# Patient Record
Sex: FEMALE | Race: OTHER | HISPANIC OR LATINO | Employment: FULL TIME | URBAN - METROPOLITAN AREA
[De-identification: names, ages, dates, MRNs, and addresses within clinical notes are randomized per-mention and may not be internally consistent; named-entity substitution may affect disease eponyms.]

---

## 2021-03-16 ENCOUNTER — HOSPITAL ENCOUNTER (EMERGENCY)
Facility: HOSPITAL | Age: 26
Discharge: HOME | End: 2021-03-16
Attending: EMERGENCY MEDICINE
Payer: COMMERCIAL

## 2021-03-16 VITALS
WEIGHT: 120 LBS | SYSTOLIC BLOOD PRESSURE: 118 MMHG | HEART RATE: 105 BPM | RESPIRATION RATE: 18 BRPM | TEMPERATURE: 98.5 F | OXYGEN SATURATION: 99 % | DIASTOLIC BLOOD PRESSURE: 55 MMHG

## 2021-03-16 DIAGNOSIS — R51.9 HEADACHE: ICD-10-CM

## 2021-03-16 DIAGNOSIS — B34.9 ACUTE VIRAL SYNDROME: Primary | ICD-10-CM

## 2021-03-16 DIAGNOSIS — R11.0 NAUSEA: ICD-10-CM

## 2021-03-16 PROCEDURE — 99284 EMERGENCY DEPT VISIT MOD MDM: CPT | Performed by: EMERGENCY MEDICINE

## 2021-03-16 PROCEDURE — 99283 EMERGENCY DEPT VISIT LOW MDM: CPT

## 2021-03-16 PROCEDURE — U0003 INFECTIOUS AGENT DETECTION BY NUCLEIC ACID (DNA OR RNA); SEVERE ACUTE RESPIRATORY SYNDROME CORONAVIRUS 2 (SARS-COV-2) (CORONAVIRUS DISEASE [COVID-19]), AMPLIFIED PROBE TECHNIQUE, MAKING USE OF HIGH THROUGHPUT TECHNOLOGIES AS DESCRIBED BY CMS-2020-01-R: HCPCS | Performed by: EMERGENCY MEDICINE

## 2021-03-16 PROCEDURE — U0005 INFEC AGEN DETEC AMPLI PROBE: HCPCS | Performed by: EMERGENCY MEDICINE

## 2021-03-16 RX ORDER — ACETAMINOPHEN 325 MG/1
975 TABLET ORAL ONCE
Status: COMPLETED | OUTPATIENT
Start: 2021-03-16 | End: 2021-03-16

## 2021-03-16 RX ORDER — IBUPROFEN 600 MG/1
600 TABLET ORAL ONCE
Status: COMPLETED | OUTPATIENT
Start: 2021-03-16 | End: 2021-03-16

## 2021-03-16 RX ADMIN — ACETAMINOPHEN 975 MG: 325 TABLET ORAL at 17:11

## 2021-03-16 RX ADMIN — IBUPROFEN 600 MG: 600 TABLET, FILM COATED ORAL at 17:11

## 2021-03-16 NOTE — ED PROVIDER NOTES
History  Chief Complaint   Patient presents with    Medical Problem     Here for covid swab      The patient is a 24yo female with no medical history who presents to the ED for evaluation of headache, myalgias, and nausea  Her symptoms started yesterday morning, headache started gradually then, followed by generalized muscle aches  Her headache feels similar to prior headaches  Does not take any medications for her symptoms  No loss of taste or smell  No vomiting, no abdominal pain, no diarrhea, no fever, no chills  She does complain of intermittent nausea, but has been tolerating p o  Intake  LMP currently on menstrual period  Recent sick contacts include her 4 children, her mother with similar symptoms  The family's apartment building is heated with electric heat, they have not been utilizing any other sources of heat  No neck stiffness, no weakness  Patient is requesting a work note  History provided by:  Patient   used: Yes ([id number])    Medical Problem  Onset quality:  Gradual  Timing:  Constant  Progression:  Unchanged  Chronicity:  New  Associated symptoms: cough, headaches, myalgias and nausea    Associated symptoms: no abdominal pain, no diarrhea, no fever, no shortness of breath and no vomiting    Cough:     Cough characteristics:  Non-productive    Onset quality:  Gradual    Timing:  Constant    Progression:  Unchanged    Chronicity:  New  Myalgias:     Location:  Neck    Onset quality:  Gradual    Timing:  Constant    Progression:  Unchanged      None       History reviewed  No pertinent past medical history  History reviewed  No pertinent surgical history  History reviewed  No pertinent family history  I have reviewed and agree with the history as documented      E-Cigarette/Vaping     E-Cigarette/Vaping Substances     Social History     Tobacco Use    Smoking status: Never Smoker    Smokeless tobacco: Never Used   Substance Use Topics    Alcohol use: Not Currently    Drug use: Not Currently        Review of Systems   Constitutional: Negative  Negative for chills and fever  HENT: Negative  Eyes: Negative  Respiratory: Positive for cough  Negative for shortness of breath  Cardiovascular: Negative  Gastrointestinal: Positive for nausea  Negative for abdominal pain, diarrhea and vomiting  Endocrine: Negative  Genitourinary: Negative  Negative for dysuria, frequency and vaginal discharge  Musculoskeletal: Positive for myalgias  Skin: Negative  Allergic/Immunologic: Negative  Neurological: Positive for headaches  Hematological: Negative  Psychiatric/Behavioral: Negative  Physical Exam  ED Triage Vitals [03/16/21 1639]   Temperature Pulse Respirations Blood Pressure SpO2   98 5 °F (36 9 °C) 105 18 118/55 99 %      Temp Source Heart Rate Source Patient Position - Orthostatic VS BP Location FiO2 (%)   Oral -- -- -- --      Pain Score       4             Orthostatic Vital Signs  Vitals:    03/16/21 1639   BP: 118/55   Pulse: 105       Physical Exam  Vitals signs reviewed  Constitutional:       Appearance: She is well-developed  She is not diaphoretic  HENT:      Head: Normocephalic and atraumatic  Right Ear: External ear normal       Left Ear: External ear normal       Nose: Nose normal    Eyes:      General: No scleral icterus  Extraocular Movements: Extraocular movements intact  Conjunctiva/sclera: Conjunctivae normal    Neck:      Musculoskeletal: Normal range of motion and neck supple  Vascular: No JVD  Trachea: No tracheal deviation  Comments: No meningismus  Cardiovascular:      Rate and Rhythm: Normal rate and regular rhythm  Heart sounds: Normal heart sounds  No murmur  Pulmonary:      Effort: Pulmonary effort is normal  No respiratory distress  Breath sounds: Normal breath sounds  Abdominal:      General: Bowel sounds are normal  There is no distension        Palpations: Abdomen is soft  Tenderness: There is no abdominal tenderness  There is no guarding  Musculoskeletal: Normal range of motion  Skin:     General: Skin is warm and dry  Capillary Refill: Capillary refill takes less than 2 seconds  Neurological:      General: No focal deficit present  Mental Status: She is alert and oriented to person, place, and time  Motor: No abnormal muscle tone  Psychiatric:         Mood and Affect: Mood normal          Behavior: Behavior normal          Thought Content: Thought content normal          Judgment: Judgment normal          ED Medications  Medications   ibuprofen (MOTRIN) tablet 600 mg (600 mg Oral Given 3/16/21 1711)   acetaminophen (TYLENOL) tablet 975 mg (975 mg Oral Given 3/16/21 1711)       Diagnostic Studies  Results Reviewed     Procedure Component Value Units Date/Time    Novel Coronavirus Marthana Colton Nickerson [[id number]] Collected: 03/16/21 1649    Lab Status: No result Specimen: Nares from Nose                  No orders to display         Procedures  Procedures      ED Course  ED Course as of Mar 16 1735   Tue Mar 16, 2021   1633 Work note                                SBIRT 22yo+      Most Recent Value   SBIRT (23 yo +)   In order to provide better care to our patients, we are screening all of our patients for alcohol and drug use  Would it be okay to ask you these screening questions? No Filed at: 03/16/2021 1634                MDM  Number of Diagnoses or Management Options  Acute viral syndrome: new and requires workup  Headache: new and does not require workup  Nausea: new and does not require workup  Diagnosis management comments: Well-appearing 30-year-old female who presents with constellation of symptoms including headache, nausea, likely viral syndrome  She does not have anything to suggest carbon monoxide, given their heat source  She has no red flag symptoms neck is supple with no meningismus, she is alert and oriented    Will treat symptomatically with Tylenol and ibuprofen, COVID-19 testing requested and a work note was given to the patient with instructions regarding return of work once COVID-19 results  All questions were answered prior to discharge  Amount and/or Complexity of Data Reviewed  Clinical lab tests: ordered  Review and summarize past medical records: yes        Disposition  Final diagnoses:   Acute viral syndrome   Headache   Nausea     Time reflects when diagnosis was documented in both MDM as applicable and the Disposition within this note     Time User Action Codes Description Comment    3/16/2021  4:57 PM Limmie Bigger [B34 9] Acute viral syndrome     3/16/2021  4:57 PM Limmie Bigger [R51 9] Headache     3/16/2021  4:57 PM Maurizio Burrows Add [R11 0] Nausea       ED Disposition     ED Disposition Condition Date/Time Comment    Discharge Stable Tue Mar 16, 2021  4:57 PM [facility] discharge to home/self care  Follow-up Information     Follow up With Specialties Details Why Contact Info Additional Information    Carlos Mejia MD Family Medicine Call  As needed [address] Emergency Department Emergency Medicine Go to  As needed, If symptoms worsen [address]  [address] [place] [address] 64 [facility] Emergency Department, [address], [facility], [address] [facility], [address]   [phone]          There are no discharge medications for this patient  No discharge procedures on file  PDMP Review     None           ED Provider  Attending physically available and evaluated [facility]  I managed the patient along with the ED Attending      Electronically Signed by         Samir Mason DO  03/16/21 7799

## 2021-03-16 NOTE — Clinical Note
Yelitza Wan was seen and treated in our emergency department on 3/16/2021  Diagnosis:     Radwalys    She may return on this date: Your COVID results were negative  Continue to quarantine until you have 3 days without symptoms     If you have any questions or concerns, please don't hesitate to call        Josh Morales PA-C    ______________________________           _______________          _______________  Hospital Representative                              Date                                Time

## 2021-03-16 NOTE — Clinical Note
Jessica Greene was seen and treated in our emergency department on 3/16/2021  Diagnosis:     Joaquin  may return to work on return date  She may return on this date:     If COVID test is negative may return after fever free for 24 hours and improving symptoms  If COVID test is positive may return after 14 days from start of symptoms if fever has resolved and symptoms are improving  Si la prueba covid es negativa puede volver después de la fiebre cherie alessandro 24 horas y [employer]  Si la prueba covid es positiva puede volver después de 14 días desde el inicio de los síntomas si la fiebre se ha resuelto y los síntomas están mejorando  If you have any questions or concerns, please don't hesitate to call        Ivan Allen MD    ______________________________           _______________          _______________  Hospital Representative                              Date                                Time

## 2021-03-16 NOTE — Clinical Note
Jennifer Salinas was seen and treated in our emergency department on 3/16/2021  Diagnosis:     Radwalys    She may return on this date: Your COVID results were negative  If you have any questions or concerns, please don't hesitate to call        Zak Fowler PA-C    ______________________________           _______________          _______________  Hospital Representative                              Date                                Time

## 2021-03-16 NOTE — DISCHARGE INSTRUCTIONS
constantino tylenol y ibuprofeno para el dolor / fiebre  puede constantino descongestionantes krystal Mucinex (disponible en el mostrador), para moco y tos  se le notificará de los Jerome de navarro prueba covid-19, en 1-2 días  por ahora, cuarentena en casa, lavarse las [place], usar navarro máscara si tiene que salir de navarro casa  si tienes problemas para respirar, regresa al servicio de [facility]

## 2021-03-17 LAB — SARS-COV-2 RNA RESP QL NAA+PROBE: NEGATIVE

## 2021-03-17 NOTE — ED ATTENDING ATTESTATION
3/16/2021  IJoan MD, saw and evaluated the patient  I have discussed the patient with the resident/non-physician practitioner and agree with the resident's/non-physician practitioner's findings, Plan of Care, and MDM as documented in the resident's/non-physician practitioner's note, except where noted  All available labs and Radiology studies were reviewed  I was present for key portions of any procedure(s) performed by the resident/non-physician practitioner and I was immediately available to provide assistance  At this point I agree with the current assessment done in the Emergency Department  I have conducted an independent evaluation of this patient a history and physical is as follows: This is a 31 y o  old female who presents to the ED for evaluation of headache myalgias, nausea  Since yesterday morning gradual onset headache and myalgias  Multiple sick contacts in the home  No loss of taste or smell, no nausea vomiting diarrhea abdominal pain  No cough  No known COVID exposures  No risk factors for carbon monoxide are identified  VS and nursing notes reviewed    General: Appears in NAD, awake, alert, speaking normally in full sentences  Well-nourished, well-developed  Appears stated age  Head: Normocephalic, atraumatic  Eyes: EOMI  Vision grossly normal  No subconjunctival hemorrhages or occular discharge noted  Symmetrical lids  ENT: Atraumatic external nose and ears  No stridor  Normal phonation  No drooling  Normal swallowing  Neck: No JVD  FROM  No goiter  CV: No pallor  Normal rate  Lungs: No tachypnea  No respiratory distress  MSK: Moving all extremities equally, no peripheral edema  Skin: Dry, intact  No cyanosis  Neuro: Awake, alert, GCS15  CN II-XII grossly intact  Grossly normal gait  Psychiatric/Behavioral: Appropriate mood and affect  A/P: This is a 31 y o  female who presents to the ED for evaluation of she viral symptoms    COVID testing symptomatic management, work note as requested    ED Course         Critical Care Time  Procedures

## 2021-03-25 ENCOUNTER — HOSPITAL ENCOUNTER (EMERGENCY)
Facility: HOSPITAL | Age: 26
Discharge: HOME | End: 2021-03-25
Attending: EMERGENCY MEDICINE
Payer: COMMERCIAL

## 2021-03-25 VITALS
RESPIRATION RATE: 18 BRPM | OXYGEN SATURATION: 100 % | TEMPERATURE: 98.5 F | DIASTOLIC BLOOD PRESSURE: 60 MMHG | SYSTOLIC BLOOD PRESSURE: 112 MMHG | HEART RATE: 89 BPM

## 2021-03-25 DIAGNOSIS — R10.9 ABDOMINAL PAIN: Primary | ICD-10-CM

## 2021-03-25 LAB
ALBUMIN SERPL BCP-MCNC: 3.6 G/DL (ref 3.5–5)
ALP SERPL-CCNC: 72 U/L (ref 46–116)
ALT SERPL W P-5'-P-CCNC: 23 U/L (ref 12–78)
ANION GAP SERPL CALCULATED.3IONS-SCNC: 6 MMOL/L (ref 4–13)
AST SERPL W P-5'-P-CCNC: 14 U/L (ref 5–45)
BASOPHILS # BLD AUTO: 0.03 THOUSANDS/ΜL (ref 0–0.1)
BASOPHILS NFR BLD AUTO: 1 % (ref 0–1)
BILIRUB SERPL-MCNC: 0.19 MG/DL (ref 0.2–1)
BILIRUB UR QL STRIP: NEGATIVE
BUN SERPL-MCNC: 8 MG/DL (ref 5–25)
CALCIUM SERPL-MCNC: 9.1 MG/DL (ref 8.3–10.1)
CHLORIDE SERPL-SCNC: 106 MMOL/L (ref 100–108)
CLARITY UR: CLEAR
CO2 SERPL-SCNC: 30 MMOL/L (ref 21–32)
COLOR UR: YELLOW
CREAT SERPL-MCNC: 0.82 MG/DL (ref 0.6–1.3)
EOSINOPHIL # BLD AUTO: 0.04 THOUSAND/ΜL (ref 0–0.61)
EOSINOPHIL NFR BLD AUTO: 1 % (ref 0–6)
ERYTHROCYTE [DISTWIDTH] IN BLOOD BY AUTOMATED COUNT: 13.9 % (ref 11.6–15.1)
EXT PREG TEST URINE: NEGATIVE
EXT. CONTROL ED NAV: NORMAL
GFR SERPL CREATININE-BSD FRML MDRD: 98 ML/MIN/1.73SQ M
GLUCOSE SERPL-MCNC: 96 MG/DL (ref 65–140)
GLUCOSE UR STRIP-MCNC: NEGATIVE MG/DL
HCT VFR BLD AUTO: 35.2 % (ref 34.8–46.1)
HGB BLD-MCNC: 10.9 G/DL (ref 11.5–15.4)
HGB UR QL STRIP.AUTO: NEGATIVE
IMM GRANULOCYTES # BLD AUTO: 0.02 THOUSAND/UL (ref 0–0.2)
IMM GRANULOCYTES NFR BLD AUTO: 0 % (ref 0–2)
KETONES UR STRIP-MCNC: NEGATIVE MG/DL
LEUKOCYTE ESTERASE UR QL STRIP: NEGATIVE
LIPASE SERPL-CCNC: 209 U/L (ref 73–393)
LYMPHOCYTES # BLD AUTO: 1.01 THOUSANDS/ΜL (ref 0.6–4.47)
LYMPHOCYTES NFR BLD AUTO: 18 % (ref 14–44)
MCH RBC QN AUTO: 24.2 PG (ref 26.8–34.3)
MCHC RBC AUTO-ENTMCNC: 31 G/DL (ref 31.4–37.4)
MCV RBC AUTO: 78 FL (ref 82–98)
MONOCYTES # BLD AUTO: 0.57 THOUSAND/ΜL (ref 0.17–1.22)
MONOCYTES NFR BLD AUTO: 10 % (ref 4–12)
NEUTROPHILS # BLD AUTO: 4.02 THOUSANDS/ΜL (ref 1.85–7.62)
NEUTS SEG NFR BLD AUTO: 70 % (ref 43–75)
NITRITE UR QL STRIP: NEGATIVE
NRBC BLD AUTO-RTO: 0 /100 WBCS
PH UR STRIP.AUTO: 7.5 [PH]
PLATELET # BLD AUTO: 254 THOUSANDS/UL (ref 149–390)
PMV BLD AUTO: 10.3 FL (ref 8.9–12.7)
POTASSIUM SERPL-SCNC: 3.8 MMOL/L (ref 3.5–5.3)
PROT SERPL-MCNC: 6.8 G/DL (ref 6.4–8.2)
PROT UR STRIP-MCNC: NEGATIVE MG/DL
RBC # BLD AUTO: 4.5 MILLION/UL (ref 3.81–5.12)
SODIUM SERPL-SCNC: 142 MMOL/L (ref 136–145)
SP GR UR STRIP.AUTO: 1.01 (ref 1–1.03)
UROBILINOGEN UR QL STRIP.AUTO: 0.2 E.U./DL
WBC # BLD AUTO: 5.69 THOUSAND/UL (ref 4.31–10.16)

## 2021-03-25 PROCEDURE — 99284 EMERGENCY DEPT VISIT MOD MDM: CPT | Performed by: EMERGENCY MEDICINE

## 2021-03-25 PROCEDURE — 96360 HYDRATION IV INFUSION INIT: CPT

## 2021-03-25 PROCEDURE — 99284 EMERGENCY DEPT VISIT MOD MDM: CPT

## 2021-03-25 PROCEDURE — 80053 COMPREHEN METABOLIC PANEL: CPT | Performed by: EMERGENCY MEDICINE

## 2021-03-25 PROCEDURE — 81025 URINE PREGNANCY TEST: CPT | Performed by: EMERGENCY MEDICINE

## 2021-03-25 PROCEDURE — 83690 ASSAY OF LIPASE: CPT | Performed by: EMERGENCY MEDICINE

## 2021-03-25 PROCEDURE — 85025 COMPLETE CBC W/AUTO DIFF WBC: CPT | Performed by: EMERGENCY MEDICINE

## 2021-03-25 PROCEDURE — 36415 COLL VENOUS BLD VENIPUNCTURE: CPT | Performed by: EMERGENCY MEDICINE

## 2021-03-25 PROCEDURE — 96361 HYDRATE IV INFUSION ADD-ON: CPT

## 2021-03-25 PROCEDURE — 81003 URINALYSIS AUTO W/O SCOPE: CPT | Performed by: EMERGENCY MEDICINE

## 2021-03-25 RX ADMIN — SODIUM CHLORIDE 1000 ML: 0.9 INJECTION, SOLUTION INTRAVENOUS at 16:25

## 2021-03-25 NOTE — Clinical Note
Davina Syed was seen and treated in our emergency department on 3/25/2021  Diagnosis:      Joaquin  may return to work on return date  She may return on this date: 03/26/2021          If you have any questions or concerns, please don't hesitate to call        Niurka Solis RN    ______________________________           _______________          _______________  Hospital Representative                              Date                                Time

## 2021-03-25 NOTE — ED PROVIDER NOTES
History  Chief Complaint   Patient presents with    Abdominal Pain     pt c/o LLQ pain for a "little while now," with nausea  denies vomiting or diarrhea  states pain is worse after eating       History provided by:  Patient   used: Yes    Abdominal Pain  Pain location:  LLQ  Pain quality: aching    Pain radiates to:  Does not radiate  Pain severity:  Moderate  Onset quality:  Gradual  Duration: Six month  Timing:  Intermittent  Progression:  Waxing and waning  Chronicity:  Recurrent  Context: not previous surgeries    Relieved by:  None tried  Worsened by:  Nothing  Ineffective treatments:  None tried  Associated symptoms: nausea    Associated symptoms: no constipation and no vomiting        None       History reviewed  No pertinent past medical history  History reviewed  No pertinent surgical history  History reviewed  No pertinent family history  I have reviewed and agree with the history as documented  E-Cigarette/Vaping     E-Cigarette/Vaping Substances     Social History     Tobacco Use    Smoking status: Never Smoker    Smokeless tobacco: Never Used   Substance Use Topics    Alcohol use: Not Currently    Drug use: Not Currently       Review of Systems   Gastrointestinal: Positive for abdominal pain and nausea  Negative for abdominal distention, constipation and vomiting  All other systems reviewed and are negative  Physical Exam  Physical Exam  Vitals signs and nursing note reviewed  Constitutional:       General: She is not in acute distress  Appearance: She is well-developed  HENT:      Head: Normocephalic and atraumatic  Eyes:      Pupils: Pupils are equal, round, and reactive to light  Neck:      Musculoskeletal: Normal range of motion and neck supple  Cardiovascular:      Rate and Rhythm: Normal rate and regular rhythm  Heart sounds: Normal heart sounds  No murmur  Pulmonary:      Effort: Pulmonary effort is normal  No respiratory distress  Breath sounds: Normal breath sounds  No wheezing or rales  Abdominal:      General: Bowel sounds are normal  There is no distension  Palpations: Abdomen is soft  Tenderness: There is no abdominal tenderness  There is no guarding or rebound  Comments: Soft, nontender, nondistended  Musculoskeletal: Normal range of motion  General: No deformity  Lymphadenopathy:      Cervical: No cervical adenopathy  Skin:     Capillary Refill: Capillary refill takes less than 2 seconds  Findings: No erythema or rash  Neurological:      Mental Status: She is alert and oriented to person, place, and time  Cranial Nerves: No cranial nerve deficit  Motor: No abnormal muscle tone        Coordination: Coordination normal    Psychiatric:         Behavior: Behavior normal          Vital Signs  ED Triage Vitals   Temperature Pulse Respirations Blood Pressure SpO2   03/25/21 1522 03/25/21 1524 03/25/21 1524 03/25/21 1524 03/25/21 1524   98 5 °F (36 9 °C) 89 18 112/60 100 %      Temp Source Heart Rate Source Patient Position - Orthostatic VS BP Location FiO2 (%)   03/25/21 1522 03/25/21 1524 03/25/21 1524 03/25/21 1524 --   Oral Monitor Sitting Left arm       Pain Score       --                  Vitals:    03/25/21 1524   BP: 112/60   Pulse: 89   Patient Position - Orthostatic VS: Sitting         Visual Acuity      ED Medications  Medications   sodium chloride 0 9 % bolus 1,000 mL (0 mL Intravenous Stopped 3/25/21 1815)       Diagnostic Studies  Results Reviewed     Procedure Component Value Units Date/Time    UA w Reflex to Microscopic w Reflex to Culture [[id number]] Collected: 03/25/21 1647    Lab Status: Final result Specimen: Urine, Other Updated: 03/25/21 1752     Color, UA Yellow     Clarity, UA Clear     Specific Gravity, UA 1 015     pH, UA 7 5     Leukocytes, UA Negative     Nitrite, UA Negative     Protein, UA Negative mg/dl      Glucose, UA Negative mg/dl      Ketones, UA Negative mg/dl      Urobilinogen, UA 0 2 E U /dl      Bilirubin, UA Negative     Blood, UA Negative    POCT pregnancy, urine [[id number]]  (Normal) Resulted: 03/25/21 1648    Lab Status: Final result Updated: 03/25/21 1650     EXT PREG TEST UR (Ref: Negative) negative     Control valid    Comprehensive metabolic panel [[id number]]  (Abnormal) Collected: 03/25/21 1624    Lab Status: Final result Specimen: Blood from Arm, Right Updated: 03/25/21 1646     Sodium 142 mmol/L      Potassium 3 8 mmol/L      Chloride 106 mmol/L      CO2 30 mmol/L      ANION GAP 6 mmol/L      BUN 8 mg/dL      Creatinine 0 82 mg/dL      Glucose 96 mg/dL      Calcium 9 1 mg/dL      AST 14 U/L      ALT 23 U/L      Alkaline Phosphatase 72 U/L      Total Protein 6 8 g/dL      Albumin 3 6 g/dL      Total Bilirubin 0 19 mg/dL      eGFR 98 ml/min/1 73sq m     Narrative:      [place] guidelines for Chronic Kidney Disease (CKD):     Stage 1 with normal or high GFR (GFR > 90 mL/min/1 73 square meters)    Stage 2 Mild CKD (GFR = 60-89 mL/min/1 73 square meters)    Stage 3A Moderate CKD (GFR = 45-59 mL/min/1 73 square meters)    Stage 3B Moderate CKD (GFR = 30-44 mL/min/1 73 square meters)    Stage 4 Severe CKD (GFR = 15-29 mL/min/1 73 square meters)    Stage 5 End Stage CKD (GFR <15 mL/min/1 73 square meters)  Note: GFR calculation is accurate only with a steady state creatinine    Lipase [[id number]]  (Normal) Collected: 03/25/21 1624    Lab Status: Final result Specimen: Blood from Arm, Right Updated: 03/25/21 1646     Lipase 209 u/L     CBC and differential [[id number]]  (Abnormal) Collected: 03/25/21 1624    Lab Status: Final result Specimen: Blood from Arm, Right Updated: 03/25/21 1632     WBC 5 69 Thousand/uL      RBC 4 50 Million/uL      Hemoglobin 10 9 g/dL      Hematocrit 35 2 %      MCV 78 fL      MCH 24 2 pg      MCHC 31 0 g/dL      RDW 13 9 %      MPV 10 3 fL      Platelets 986 Thousands/uL      nRBC 0 /100 WBCs Neutrophils Relative 70 %      Immat GRANS % 0 %      Lymphocytes Relative 18 %      Monocytes Relative 10 %      Eosinophils Relative 1 %      Basophils Relative 1 %      Neutrophils Absolute 4 02 Thousands/µL      Immature Grans Absolute 0 02 Thousand/uL      Lymphocytes Absolute 1 01 Thousands/µL      Monocytes Absolute 0 57 Thousand/µL      Eosinophils Absolute 0 04 Thousand/µL      Basophils Absolute 0 03 Thousands/µL                  No orders to display              Procedures  Procedures         ED Course                                           MDM  Number of Diagnoses or Management Options  Abdominal pain: new and requires workup  Diagnosis management comments: Left lower quadrant abdominal pain, present after eating for the past 6 months  Patient is [facility]   She states she had workup done including blood work and ultrasound but nothing was found  She has no current pain at this time  She states pain routinely gets worse with eating  Denies any vaginal bleeding or discharge  Denies any previous surgical history  Vital signs unremarkable  Abdomen soft and nontender  Will check basic laboratory studies  Basic labs unremarkable  Abdomen soft and nontender, no emergent imaging indicated  Patient will likely benefit outpatient GI evaluation given duration of symptoms  Given information GI clinic         Amount and/or Complexity of Data Reviewed  Clinical lab tests: ordered and reviewed  Tests in the medicine section of CPT®: ordered and reviewed    Risk of Complications, Morbidity, and/or Mortality  Presenting problems: high  Diagnostic procedures: moderate  Management options: high    Patient Progress  Patient progress: stable      Disposition  Final diagnoses:   Abdominal pain     Time reflects when diagnosis was documented in both MDM as applicable and the Disposition within this note     Time User Action Codes Description Comment    3/25/2021  5:54 PM Jessica Liu Add [R10 9] Abdominal pain       ED Disposition     ED Disposition Condition Date/Time Comment    Discharge Stable Thu Mar 25, 2021  5:53 PM Joaquin Olmedo discharge to home/self care  Follow-up Information     Follow up With Specialties Details Why Contact Info Additional [facility] Schedule an appointment as soon as possible for a visit in 1 week Follow-up recurrent abdominal pain with eating [address] [facility] [address] [facility] [address] [phone] Neva Nolasco [facility], [address] [place] [address] [address], [place], [address] [facility], [address]    Felicitas Oakes MD Family Medicine Schedule an appointment as soon as possible for a visit in 2 days As needed [address] [facility],[address] [place] [address] Emergency Department Emergency Medicine Go to  If symptoms worsen [address] [facility] [address] [facility] Emergency Department, [address], [place], [address] [facility], [address]          There are no discharge medications for this patient  No discharge procedures on file      PDMP Review     None          ED Provider  Electronically Signed by           Belle Busch MD  03/25/21 2012

## 2021-03-30 ENCOUNTER — OFFICE VISIT (OUTPATIENT)
Dept: GASTROENTEROLOGY | Facility: CLINIC | Age: 26
End: 2021-03-30
Payer: COMMERCIAL

## 2021-03-30 VITALS
TEMPERATURE: 97.3 F | HEIGHT: 64 IN | DIASTOLIC BLOOD PRESSURE: 70 MMHG | HEART RATE: 88 BPM | WEIGHT: 130 LBS | BODY MASS INDEX: 22.2 KG/M2 | SYSTOLIC BLOOD PRESSURE: 104 MMHG

## 2021-03-30 DIAGNOSIS — D50.8 OTHER IRON DEFICIENCY ANEMIA: ICD-10-CM

## 2021-03-30 DIAGNOSIS — R10.32 LEFT LOWER QUADRANT ABDOMINAL PAIN: Primary | ICD-10-CM

## 2021-03-30 DIAGNOSIS — R10.13 DYSPEPSIA: ICD-10-CM

## 2021-03-30 PROBLEM — D50.9 IRON DEFICIENCY ANEMIA: Status: ACTIVE | Noted: 2021-03-30

## 2021-03-30 PROCEDURE — 99204 OFFICE O/P NEW MOD 45 MIN: CPT | Performed by: INTERNAL MEDICINE

## 2021-03-30 RX ORDER — PANTOPRAZOLE SODIUM 40 MG/1
40 TABLET, DELAYED RELEASE ORAL DAILY
Qty: 30 TABLET | Refills: 3 | Status: SHIPPED | OUTPATIENT
Start: 2021-03-30

## 2021-03-30 NOTE — PROGRESS NOTES
[facility] - Outpatient Consultation  Arian Castillo 31 y o  female MRN: [id number]  Encounter: [id number]          ASSESSMENT AND PLAN:      31year old female here for evaluation of anemia, chronic abdominal pain and nausea  She has been experiencing pain in lower abdomen and epigastric area  Recently she noted significant worsening of her symptoms  Her pain is worse after eating  Differential diagnosis include peptic ulcer disease, dyspepsia, irritable bowel syndrome rule out inflammatory bowel disease  Because of significant tenderness on left lower quadrant I will schedule her for CT scan to rule out diverticulitis and inflammatory bowel disease  EGD and colonoscopy to assess for peptic ulcer disease and inflammatory bowel  I will start her on pantoprazole 40 mg daily   Avoid NSAID  Check celiac panel  Follow-up of the workup is completed  ______________________________________________________________________    HPI:  11-year-old female who recently moved from [facility] here for evaluation chronic abdominal pain and nausea  She has been experiencing lower abdominal pain predominantly on left lower quadrant and epigastric region  Her symptoms started several years ago but she noted recent worsening of her symptoms  She had multiple ER visits in [facility] and here recently  She recalls having ultrasound in [facility] and she was told that she had small ovarian cyst   She denies diarrhea or constipation  No melena or hematochezia  She takes Advil 1 to 2 times a month for migraine headache and menstrual cramps  She had 2  section and 1 spontaneous vaginal delivery  She denies family history of inflammatory bowel disease  She denies weight loss  Hb 10 9    REVIEW OF SYSTEMS:    CONSTITUTIONAL: Denies any fever, chills, rigors, and weight loss  HEENT: No earache or tinnitus  Denies hearing loss or visual disturbances    CARDIOVASCULAR: No chest pain or palpitations  RESPIRATORY: Denies any cough, hemoptysis, shortness of breath or dyspnea on exertion  GASTROINTESTINAL: As noted in the History of Present Illness  GENITOURINARY: No problems with urination  Denies any hematuria or dysuria  NEUROLOGIC: No dizziness or vertigo, denies headaches  MUSCULOSKELETAL: Denies any muscle or joint pain  SKIN: Denies skin rashes or itching  ENDOCRINE: Denies excessive thirst  Denies intolerance to heat or cold  PSYCHOSOCIAL: Denies depression or anxiety  Denies any recent memory loss  Historical Information   History reviewed  No pertinent past medical history  History reviewed  No pertinent surgical history  Social History   Social History     Substance and Sexual Activity   Alcohol Use Not Currently     Social History     Substance and Sexual Activity   Drug Use Not Currently     Social History     Tobacco Use   Smoking Status Never Smoker   Smokeless Tobacco Never Used     History reviewed  No pertinent family history  Meds/Allergies     No current outpatient medications on file  No Known Allergies        Objective     Blood pressure 104/70, pulse 88, temperature (!) 97 3 °F (36 3 °C), temperature source Tympanic, height 5' 4" (1 626 m), weight 59 kg (130 lb)  Body mass index is 22 31 kg/m²  PHYSICAL EXAM:      General Appearance:   Alert, cooperative, no distress   HEENT:   Normocephalic, atraumatic, anicteric      Neck:  Supple, symmetrical, trachea midline   Lungs:   Clear to auscultation bilaterally; no rales, rhonchi or wheezing; respirations unlabored    Heart[de-identified]   Regular rate and rhythm; no murmur, rub, or gallop     Abdomen:   Soft, LLQ tenderness, non-distended; normal bowel sounds; no masses, no organomegaly    Genitalia:   Deferred    Rectal:   Deferred    Extremities:  No cyanosis, clubbing or edema    Pulses:  2+ and symmetric    Skin:  No jaundice, rashes, or lesions    Lymph nodes:  No palpable cervical lymphadenopathy  Lab Results:   No visits with results within 1 Day(s) from this visit     Latest known visit with results is:   Admission on 03/25/2021, Discharged on 03/25/2021   Component Date Value    WBC 03/25/2021 5 69     RBC 03/25/2021 4 50     Hemoglobin 03/25/2021 10 9*    Hematocrit 03/25/2021 35 2     MCV 03/25/2021 78*    MCH 03/25/2021 24 2*    MCHC 03/25/2021 31 0*    RDW 03/25/2021 13 9     MPV 03/25/2021 10 3     Platelets 63/10/3408 254     nRBC 03/25/2021 0     Neutrophils Relative 03/25/2021 70     Immat GRANS % 03/25/2021 0     Lymphocytes Relative 03/25/2021 18     Monocytes Relative 03/25/2021 10     Eosinophils Relative 03/25/2021 1     Basophils Relative 03/25/2021 1     Neutrophils Absolute 03/25/2021 4 02     Immature Grans Absolute 03/25/2021 0 02     Lymphocytes Absolute 03/25/2021 1 01     Monocytes Absolute 03/25/2021 0 57     Eosinophils Absolute 03/25/2021 0 04     Basophils Absolute 03/25/2021 0 03     Sodium 03/25/2021 142     Potassium 03/25/2021 3 8     Chloride 03/25/2021 106     CO2 03/25/2021 30     ANION GAP 03/25/2021 6     BUN 03/25/2021 8     Creatinine 03/25/2021 0 82     Glucose 03/25/2021 96     Calcium 03/25/2021 9 1     AST 03/25/2021 14     ALT 03/25/2021 23     Alkaline Phosphatase 03/25/2021 72     Total Protein 03/25/2021 6 8     Albumin 03/25/2021 3 6     Total Bilirubin 03/25/2021 0 19*    eGFR 03/25/2021 98     Lipase 03/25/2021 209     EXT PREG TEST UR (Ref: N* 03/25/2021 negative     Control 03/25/2021 valid     Color, UA 03/25/2021 Yellow     Clarity, UA 03/25/2021 Clear     Specific Gravity, UA 03/25/2021 1 015     pH, UA 03/25/2021 7 5     Leukocytes, UA 03/25/2021 Negative     Nitrite, UA 03/25/2021 Negative     Protein, UA 03/25/2021 Negative     Glucose, UA 03/25/2021 Negative     Ketones, UA 03/25/2021 Negative     Urobilinogen, UA 03/25/2021 0 2     Bilirubin, UA 03/25/2021 Negative     Blood, UA 03/25/2021 Negative          Radiology Results:   No results found

## 2021-03-30 NOTE — PATIENT INSTRUCTIONS
EGD/COLON scheduled on 4/19/2021 with Dr Lamb at [facility] gave pt verbal instructions/paper work given    Prep-Miralax/Dulcolax  Ct-scan scheduled on 4/14/21 at 10pm

## 2021-04-15 ENCOUNTER — TELEPHONE (OUTPATIENT)
Dept: GASTROENTEROLOGY | Facility: CLINIC | Age: 26
End: 2021-04-15

## 2021-04-15 DIAGNOSIS — R10.32 LEFT LOWER QUADRANT ABDOMINAL PAIN: Primary | ICD-10-CM

## 2021-04-15 NOTE — TELEPHONE ENCOUNTER
Unfortunately, she did not have any of these findings on recent labs  Dr Jennifer Nazario  Did not make note vomiting or fever as part of her symptoms  She has EGD and colonoscopy scheduled on 04/19  We will see if imaging will be needed at that time, thank you

## 2021-04-15 NOTE — TELEPHONE ENCOUNTER
Patient of Dr Shakir Patel, last seen 3/31/21    History of anemia, chronic abdominal pain, LLQ pain    CT was ordered to r/o diverticulitis given pain and tenderness in LLQ  Insurance will not approve and was requesting additional clinical information such as KUB or u/s  I called and spoke with clinical reviewer and let her know that we need CT since we are trying to r/o diverticulitis  She states that unfortunately it cannot get approved without accompanying symptoms of fever, elevated WBC count, or vomiting documented  She states that once we have additional clinical information we can call [phone] but until then it will not be approved   Please advise

## 2021-04-15 NOTE — TELEPHONE ENCOUNTER
[facility] Gastro[facility]ology Nurse             TRACKING NUMBER: [id number]   RE: Authorization Request MEMBER ID: [id number]   PATIENT NAME: Asya Hughes PLAN: [facility]   We have received your request for Abdomen and Pelvis CT (Computed Tomography - pictures of inside your belly area)  We are   unable to approve based on the information provided to date, please respond     URGENT: REPLY REQUIRED FOR CASE REVIEW   Request for Additional Clinical Information   Most recent results pertaining to prior US or KUB imaging or scope; within 6 months  Additional information is still needed   We have received your request for Abdomen and Pelvis CT (Computed Tomography - pictures of inside your belly area)   along with additional records  However, the information provided still does not support the medical necessity of these   services to make a determination on this case  Please see the documentation needed below which may allow us to   make a positive determination  Only sending daily notes may delay authorization  Most recent results pertaining to prior US or KUB imaging or scope; within 6 months  You may submit records for this episode of care via fax ([phone]) or by uploading them on [email or web address]  [email or web address]  Please do not resend the information previously submitted      Once written information has been received, the case   will be reviewed by a clinician and you will be notified of the determination  If this case is urgent, you may speak with a   Clinician at [phone]  For information regarding [facility] clinical guidelines used for determinations, please see [email or web address] com CLINICAL   GUIDELINES  All information supplied is considered part of the member's utilization review record with [facility] and will be kept strictly   confidential in accordance with HIPAA and/or applicable state law   For questions, please contact the [facility] call center at

## 2021-04-19 ENCOUNTER — HOSPITAL ENCOUNTER (OUTPATIENT)
Dept: GASTROENTEROLOGY | Facility: AMBULARY SURGERY CENTER | Age: 26
Setting detail: OUTPATIENT SURGERY
Discharge: HOME | End: 2021-04-19
Attending: INTERNAL MEDICINE

## 2021-04-19 NOTE — TELEPHONE ENCOUNTER
Patient was supposed to have EGD/colonoscopy this morning and canceled  Called patient via language line  to discuss  She denies any vomiting and has no fevers that she is aware of  I advised her I will order CBC to check her WBC count, if elevated we can proceed with CT but otherwise the best thing she can do is reschedule scopes and get lab work up done as ordered  She verbalized understanding  I advised her she also has celiac panel ordered and she should get this done as well   She verbalized understanding

## 2021-04-19 NOTE — TELEPHONE ENCOUNTER
[address] [facility] Gastroenterology Nurse             We have received noticed from [facility] for Abdomen and Pelvis   However, additional information is needed in the form of clinical records which   support the medical necessity of these services to make a determination on this case  Study Requested: Abdomen and Pelvis CT   Please PROVIDE:   Most recent results pertaining to prior US or KUB imaging or scope; within 6 months  I did not see in chart, can you please confirm if any of these studies has been preformed     Please advise patient of next step, if these studies were not preformed first this will delay this auth to get approved or may require a peer to peer on why this was not completed

## 2021-04-30 NOTE — TELEPHONE ENCOUNTER
[address] [facility] Gastroenterology Nurse             [facility]   Your request was denied completely because: We reviewed the medical information given by your doctor  Your doctor's records say   you have belly pain  With what was received from your doctor, a decision was made that   belly pain  But your exam does not show one of these problems (mass, enlarged   organs, or signs of infection) is not a reason for a(n) Abdomen and Pelvis CT  To   approve, we need doctor's notes that say you have other complaints with your tender   belly  It could be that you do not feel like eating  You might have nausea and vomiting, a   fever, or a bad blood test (elevated white blood cell count)  It is our medical view that   the Abdomen and Pelvis CT be denied as it is not medically necessary   [facility] Clinical   Guideline 068 for Abdomen Pelvis CT was used in this request     Most recent results pertaining to prior US or KUB imaging or scope; within 6 months needed   In addition, the Ordering Physician can schedule a [id number] discussion with a Physician Reviewer or your   representative with clinical information can call [phone] tracking #[id number]

## 2021-04-30 NOTE — TELEPHONE ENCOUNTER
CT has been denied completely  As noted below, I advised that without further work up we cannot get this approved  I advised that the best thing she could do was get blood work done because if there is an elevated WBC she will be approved  I also had advised she reschedule her colonoscopy/EGD as well  It appears she has not done this  I called her to discuss again   I called via language line , reached voicemail, left message instructing her to call back and follow up if she is still having abdominal pain

## 2021-06-04 ENCOUNTER — TELEPHONE (OUTPATIENT)
Dept: GASTROENTEROLOGY | Facility: CLINIC | Age: 26
End: 2021-06-04

## 2021-06-04 NOTE — TELEPHONE ENCOUNTER
Called and LVM for pt to call the office to reschedule appt with [facility] on 6/30 due to change in her schedule

## 2022-01-18 ENCOUNTER — OFFICE VISIT (OUTPATIENT)
Dept: DENTISTRY | Facility: CLINIC | Age: 27
End: 2022-01-18

## 2022-01-18 VITALS — DIASTOLIC BLOOD PRESSURE: 91 MMHG | SYSTOLIC BLOOD PRESSURE: 119 MMHG | HEART RATE: 96 BPM | TEMPERATURE: 97.5 F

## 2022-01-18 DIAGNOSIS — K03.6 SUPRAGINGIVAL DENTAL CALCULUS: Primary | ICD-10-CM

## 2022-01-18 PROCEDURE — D4355 FULL MOUTH DEBRIDEMENT TO ENABLE A COMPREHENSIVE ORAL EVALUATION AND DIAGNOSIS ON A SUBSEQUENT VISIT: HCPCS

## 2022-01-18 NOTE — PROGRESS NOTES
Patient presents for full mouth debridement    HHX: reviewed    Ultrasonic  was used to remove the majority of supracalculus and some subcalculus from teeth  Explained to patient following this appointment, the patient must return in about 2-4 weeks for  gum evaluation to determine if further gum therapy is indicated via scaling/root planing as well as finish the comprehensive exam    Explained to patient this type of a cleaning preformed today was not a thorough cleaning, it was to remove excess debris from teeth to be able to evaluate and diagnose properly  Ian Noriega [facility]      NV: 2-4 week perio evaluation following debridement and complete comprehensive exam  Any provider 60 mins

## 2022-02-08 ENCOUNTER — OFFICE VISIT (OUTPATIENT)
Dept: DENTISTRY | Facility: CLINIC | Age: 27
End: 2022-02-08

## 2022-02-08 VITALS — HEART RATE: 90 BPM | DIASTOLIC BLOOD PRESSURE: 82 MMHG | SYSTOLIC BLOOD PRESSURE: 132 MMHG | TEMPERATURE: 97.7 F

## 2022-02-08 DIAGNOSIS — K08.409 HISTORY OF THIRD MOLAR TOOTH EXTRACTION, UNSPECIFIED EDENTULISM CLASS: Primary | ICD-10-CM

## 2022-02-08 NOTE — PROGRESS NOTES
COMP/PERIO CHARTING post FM Debridement  Updated Assessment  Fm Probe reveals gen 4-5mm w/ gen bup  Subcalc clinically felt when probing  Discussed perio cond w/ pt, rec FM SRP    Dr Lorene Warren Ex: Pt interested in ortho  Dr Lorene Warren rec FM SRP, then fillings, ext of #1 and 32 w/ OS, then pt can proceed w/ ortho treatment  Pt understands and agrees  Ref for ext 1 and 32 w OS given to pt  Cont to monitor radiopaque area seen on PA #20-21 ip       Treatment needed:  5DO  8M  9ML  10MLD  11ML    NVs:  1)UR/LR SRP w/ anesthesia  2)UL/LL SRP w/ anesthesia  3)4-6 week re-eval  4)Fillings  5)Ortho consult

## 2024-01-05 ENCOUNTER — OFFICE VISIT (OUTPATIENT)
Dept: DENTISTRY | Facility: CLINIC | Age: 29
End: 2024-01-05

## 2024-01-05 DIAGNOSIS — Z01.21 ENCOUNTER FOR DENTAL EXAMINATION AND CLEANING WITH ABNORMAL FINDINGS: Primary | ICD-10-CM

## 2024-01-05 PROCEDURE — D0274 BITEWINGS - 4 RADIOGRAPHIC IMAGES: HCPCS

## 2024-01-05 NOTE — DENTAL PROCEDURE DETAILS
Patient presents for updated BWX and Perio charting to re submit for SRPs in all quads.    CC: Pre D was submitted to [facility] on 2/8/2022.  Since it has been over a year, a new Pre D will be re submitted today.    HHX reviewed  ASA:II  PAIN:0/10    Radiographs taken: 4 BWX    NV:SRP UR/LR  NV2:SRP UL/LL

## 2024-06-13 ENCOUNTER — OFFICE VISIT (OUTPATIENT)
Dept: DENTISTRY | Facility: CLINIC | Age: 29
End: 2024-06-13

## 2024-06-13 VITALS — HEART RATE: 66 BPM | SYSTOLIC BLOOD PRESSURE: 113 MMHG | DIASTOLIC BLOOD PRESSURE: 80 MMHG

## 2024-06-13 DIAGNOSIS — K05.6 PERIODONTAL DISEASE: Primary | ICD-10-CM

## 2024-06-13 PROCEDURE — D4341 PERIODONTAL SCALING AND ROOT PLANING - 4 OR MORE TEETH PER QUADRANT: HCPCS

## 2024-06-13 NOTE — DENTAL PROCEDURE DETAILS
Reviewed hhx  ASA: II  PAIN:0/10  Pts CC:Nothing at this time    Patient presents for SRP in quads :UR QUAD    Anesthesia-  Topical gel benzocaine 20% was applied to tissue  Carbocaine no epi 3%  2 carps were given via short needle.  Type of injection:Infiltations  Neg aspirations and no complications for all.     Cavitron and hand instruments used  Bleeding: heavy  Supra/sub calculus was removed from tooth surfaces    OHI: reviewed with the patient the need to maintain proper oral hygiene regimen at home. Brushing 2x/day for 2 minutes, flossing subgingivally daily and mouthrinse 1-2x/day for 60 seconds.     Advised patient periodontal disease is an oral disease we can treat and maintain but cannot cure. Without proper dental visits and proper at home dental care this disease may return. Patient understands.     Following scaling and root planing, pt will return for 12 week perio re-eval./maint. If healing is sufficient, the patient will then continue on 3 month periodontal maintenance appointments. If healing is insufficient, pt may need to be assessed for gingival flap surgery.     Pt dismissed in good health, no complications and all questions answered. Pt informed of anesthetic lasting a couple hours after procedure and to be careful eating/chewing until anesthetic has worn off.     NV: SRP LR